# Patient Record
Sex: FEMALE | Race: WHITE
[De-identification: names, ages, dates, MRNs, and addresses within clinical notes are randomized per-mention and may not be internally consistent; named-entity substitution may affect disease eponyms.]

---

## 2017-03-29 ENCOUNTER — HOSPITAL ENCOUNTER (EMERGENCY)
Dept: HOSPITAL 10 - E/R | Age: 62
Discharge: HOME | End: 2017-03-29
Payer: COMMERCIAL

## 2017-03-29 VITALS — BODY MASS INDEX: 35.2 KG/M2 | WEIGHT: 152.12 LBS | HEIGHT: 55 IN

## 2017-03-29 VITALS — SYSTOLIC BLOOD PRESSURE: 141 MMHG | RESPIRATION RATE: 20 BRPM | TEMPERATURE: 98.9 F | DIASTOLIC BLOOD PRESSURE: 76 MMHG

## 2017-03-29 DIAGNOSIS — Z79.4: ICD-10-CM

## 2017-03-29 DIAGNOSIS — Z79.84: ICD-10-CM

## 2017-03-29 DIAGNOSIS — G58.8: Primary | ICD-10-CM

## 2017-03-29 DIAGNOSIS — E11.9: ICD-10-CM

## 2017-03-29 LAB
ADD SCAN DIFF: NO
ADD UMIC: NO
ALBUMIN SERPL-MCNC: 4.3 G/DL (ref 3.3–4.9)
ALBUMIN/GLOB SERPL: 1.34 {RATIO}
ALP SERPL-CCNC: 120 IU/L (ref 42–121)
ALT SERPL-CCNC: 63 IU/L (ref 13–69)
ANION GAP SERPL CALC-SCNC: 13 MMOL/L (ref 8–16)
APTT BLD: 34.7 SEC (ref 25–35)
AST SERPL-CCNC: 41 IU/L (ref 15–46)
BASOPHILS # BLD AUTO: 0 10^3/UL (ref 0–0.1)
BASOPHILS NFR BLD: 0.2 % (ref 0–2)
BILIRUB DIRECT SERPL-MCNC: 0 MG/DL (ref 0–0.2)
BILIRUB SERPL-MCNC: 0.2 MG/DL (ref 0.2–1.3)
BUN SERPL-MCNC: 19 MG/DL (ref 7–20)
CALCIUM SERPL-MCNC: 10.3 MG/DL (ref 8.4–10.2)
CHLORIDE SERPL-SCNC: 103 MMOL/L (ref 97–110)
CO2 SERPL-SCNC: 29 MMOL/L (ref 21–31)
COLOR UR: (no result)
CREAT SERPL-MCNC: 0.85 MG/DL (ref 0.44–1)
EOSINOPHIL # BLD: 0.2 10^3/UL (ref 0–0.5)
EOSINOPHIL NFR BLD: 3.6 % (ref 0–7)
ERYTHROCYTE [DISTWIDTH] IN BLOOD BY AUTOMATED COUNT: 14.9 % (ref 11.5–14.5)
GLOBULIN SER-MCNC: 3.2 G/DL (ref 1.3–3.2)
GLUCOSE SERPL-MCNC: 215 MG/DL (ref 70–220)
HCT VFR BLD CALC: 37.2 % (ref 37–47)
HGB BLD-MCNC: 12 G/DL (ref 12–16)
INR PPP: 0.91
KETONES UR STRIP.AUTO-MCNC: NEGATIVE MG/DL
LYMPHOCYTES # BLD AUTO: 1.6 10^3/UL (ref 0.8–2.9)
LYMPHOCYTES NFR BLD AUTO: 37.8 % (ref 15–51)
MCH RBC QN AUTO: 27.3 PG (ref 29–33)
MCHC RBC AUTO-ENTMCNC: 32.3 G/DL (ref 32–37)
MCV RBC AUTO: 84.5 FL (ref 82–101)
MONOCYTES # BLD: 0.3 10^3/UL (ref 0.3–0.9)
MONOCYTES NFR BLD: 6.3 % (ref 0–11)
NEUTROPHILS # BLD: 2.2 10^3/UL (ref 1.6–7.5)
NEUTROPHILS NFR BLD AUTO: 51.9 % (ref 39–77)
NITRITE UR QL STRIP.AUTO: NEGATIVE
NRBC # BLD MANUAL: 0 10^3/UL (ref 0–0)
NRBC BLD QL: 0 /100WBC (ref 0–0)
PLATELET # BLD: 229 10^3/UL (ref 140–415)
PMV BLD AUTO: 10.5 FL (ref 7.4–10.4)
POTASSIUM SERPL-SCNC: 4.1 MMOL/L (ref 3.5–5.1)
PROT SERPL-MCNC: 7.5 G/DL (ref 6.1–8.1)
PROTHROMBIN TIME: 12.2 SEC (ref 12.2–14.2)
PT RATIO: 1
RBC # BLD AUTO: 4.4 10^6/UL (ref 4.2–5.4)
RBC # UR AUTO: NEGATIVE /UL
SODIUM SERPL-SCNC: 141 MMOL/L (ref 135–144)
TROPONIN I SERPL-MCNC: < 0.012 NG/ML (ref 0–0.12)
URINE BILIRUBIN (DIP): NEGATIVE
URINE TOTAL PROTEIN (DIP): NEGATIVE
UROBILINOGEN UR STRIP-ACNC: (no result) (ref 0.1–1)
WBC # BLD AUTO: 4.2 10^3/UL (ref 4.8–10.8)
WBC # UR STRIP: NEGATIVE /UL

## 2017-03-29 PROCEDURE — 74176 CT ABD & PELVIS W/O CONTRAST: CPT

## 2017-03-29 PROCEDURE — 93005 ELECTROCARDIOGRAM TRACING: CPT

## 2017-03-29 PROCEDURE — 84484 ASSAY OF TROPONIN QUANT: CPT

## 2017-03-29 PROCEDURE — 96375 TX/PRO/DX INJ NEW DRUG ADDON: CPT

## 2017-03-29 PROCEDURE — 80053 COMPREHEN METABOLIC PANEL: CPT

## 2017-03-29 PROCEDURE — 83690 ASSAY OF LIPASE: CPT

## 2017-03-29 PROCEDURE — 87086 URINE CULTURE/COLONY COUNT: CPT

## 2017-03-29 PROCEDURE — 85730 THROMBOPLASTIN TIME PARTIAL: CPT

## 2017-03-29 PROCEDURE — 85025 COMPLETE CBC W/AUTO DIFF WBC: CPT

## 2017-03-29 PROCEDURE — 36415 COLL VENOUS BLD VENIPUNCTURE: CPT

## 2017-03-29 PROCEDURE — 96374 THER/PROPH/DIAG INJ IV PUSH: CPT

## 2017-03-29 PROCEDURE — 85610 PROTHROMBIN TIME: CPT

## 2017-03-29 PROCEDURE — 81003 URINALYSIS AUTO W/O SCOPE: CPT

## 2017-03-29 NOTE — RADRPT
PROCEDURE:   CT abdomen and pelvis without contrast. 

 

CLINICAL INDICATION:   Left upper quadrant flank pain

 

TECHNIQUE:   CT scan of the abdomen and pelvis without contrast was performed.  Sagittal and coronal
 reformatted images were obtained from the axial source images. CTDI = 16.02 mGy; DLP = 917.33 mGy-c
m

 

COMPARISON:   None available. 

 

FINDINGS:

 

Visualized lower thorax:  The lung bases are clear.  There is no evidence for pleural effusion.

 

Liver, gallbladder, pancreas and spleen:  Hepatomegaly of 20 cm is present with low attenuation of t
he liver parenchyma consistent with severe hepatic steatosis, liver contour is preserved.  There is 
no evidence for a liver mass or ductal dilatation.  Findings are compatible with prior cholecystecto
my.  No common bile duct abnormality is demonstrated.  The pancreas is unremarkable.  The spleen is 
normal in size.  

 

Adrenal glands and genitourinary system: The adrenal glands are normal bilaterally.  The kidneys are
 normal and size, contour and attenuation with no evidence for masses, calculi or hydronephrosis.  T
he ureters are unremarkable.  No urinary bladder abnormality is demonstrated.  There is a lobulated 
in contour and enlarged for the patient's age with multiple uterine corpus calcifications consistent
 with leiomyomata.  There is no evidence of ovarian or adnexal mass.  No free fluid is seen in the c
ul-de-sac.

 

Gastrointestinal system:  The stomach is normal in caliber with no abnormality of significance.  The
 small bowel is normal in caliber with no ileus, obstruction or wall thickening.  The appendix and s
urrounding fat are within the limits of normal.  The colon shows no evidence for wall thickening or 
acute abnormality.  There is no evidence for colitis or diverticulitis.

 

Peritoneum, retroperitoneum, lymph nodes and vessels: The abdominal aorta is normal in caliber.  The
re is mild aortic and iliac system atherosclerotic calcification.  The inferior vena cava is unremar
kable.  There is no evidence for adenopathy or mass.  There is no ascites. No pneumoperitoneum is pr
esent

 

Osseous structures and musculoskeletal findings:  There is no fracture, lytic or blastic lesion. Sev
ere degenerative disk narrowing and endplate sclerosis is present at L3-4, L4-5 and L5-S1. A small c
ystic lesion in the region of the umbilicus is possibly a week are patent with no intraperitoneal ab
normality or connection.  Calcified granuloma of the left gluteal fat is instantly noted.  No muscul
ar pathology is seen.

 

RPTAT:HJJR

 

IMPRESSION:

 

1.  No evidence of urinary tract calculi or hydronephrosis. 

2.  The stomach, spleen and left upper quadrant structures are unremarkable.

3.  Hepatomegaly and severe hepatic steatosis.

4.  Enlarged leiomyomatous uterus.

5.  Previous cholecystectomy.

6.  Atherosclerotic calcification of the aorta and iliac systems.

7.  Severe degenerative disk disease at L3-4, L4-5 and L5-S1.

_____________________________________________ 

Richmond Mohamud Physician           Date    Time 

Electronically viewed and signed by Richmond Mohamud Physician on 03/29/2017 18:58 

 

D:  03/29/2017 18:58  T:  03/29/2017 18:58

JR/

## 2017-03-29 NOTE — ERD
ER Documentation


Chief Complaint


Date/Time


DATE: 3/29/17 


TIME: 21:13


Chief Complaint


AP AND LEFT FLANK PAIN FOR THE PAST FEW DAYS. NO FEVERS. NO N/V





HPI


61-year-old female with a history of diabetes and fibromyalgia presenting with 

left flank pain for about 5 days.  The pain is burning, throbbing, radiating to 

her left upper quadrant.  It has progressively worsened over the past 5 days.  

She denies any associated nausea, vomiting, diarrhea, constipation, dysuria, 

fevers or chills.  No recent trauma.  Pain is worse with touching the area or 

with any movement.  Nothing seems to help with the pain.  Pain is rated as a 10 

out of 10.





ROS


All systems reviewed and are negative except as per history of present illness.





Medications


Home Meds


Active Scripts


Hydrocodone/Acetaminophen (Norco 5-325 Tablet) 1 Each Tablet, 1 TAB PO Q6H Y 

for PAIN, #20 TAB


   Prov:MICHELLE CARUSO MD         3/29/17


Valacyclovir HCl (Valtrex) 1,000 Mg Tablet, 1000 MG PO TID for 7 Days, TAB


   Prov:MICHELLE CARUSO MD         3/29/17


Reported Medications


Glimepiride* (Glimepiride*) 4 Mg Tablet, 4 MG PO WITH BREAKFAST DINNE, TAB


   3/29/17


Insulin Glargine* (Lantus*) 100 Unit/Ml Soln, 50 UNIT SC QHS, #1 VIAL


   3/29/17


Gabapentin* (Gabapentin*) 400 Mg Capsule, 400 MG PO BID, #90 CAP


   3/29/17


Metformin Hcl* (Metformin Hcl*) 1,000 Mg Tablet, 1000 MG PO WITH BREAKFAST DINNE

, #30 TAB


   3/29/17





Allergies


Allergies:  


Coded Allergies:  


     No Known Allergy (Unverified , 3/29/17)





PMhx/Soc


History of Surgery:  Yes (, gallbladder)


Anesthesia Reaction:  No


Hx Neurological Disorder:  No


Hx Respiratory Disorders:  No


Hx Cardiac Disorders:  No


Hx Psychiatric Problems:  No


Hx Miscellaneous Medical Probl:  Yes (DM II- Insulin and Oral, fibromyalgia)


Hx Alcohol Use:  No


Hx Substance Use:  No


Hx Tobacco Use:  No


Smoking Status:  Never smoker





FmHx


Family History:  No diabetes





Physical Exam


Vitals





Vital Signs








  Date Time  Temp Pulse Resp B/P Pulse Ox O2 Delivery O2 Flow Rate FiO2


 


3/29/17 17:16 98.9 78 20 141/76 98   








Physical Exam


Const: Nontoxic, well-appearing, distress secondary to pain


Head:   Atraumatic 


Eyes:    Normal Conjunctiva


ENT:    Normal External Ears, Nose and Mouth.


Neck:               Full range of motion..~ No meningismus.


Resp:    Clear to auscultation bilaterally


Cardio:    Regular rate and rhythm, no murmurs


Abd:    Soft, non tender, non distended.  No rebound or guarding.  Normal bowel 

sounds.  No rashes.


Skin:    No petechiae or rashes


Back:    No midline tenderness, left CVA mild tenderness


Ext:    No cyanosis, or edema


Neur:    Awake and alert


Psych:    Normal Mood and Affect


Result Diagram:  


3/29/17 1840                                                                   

             3/29/17 1840





Results 24 hrs





 Laboratory Tests








Test


  3/29/17


18:40 3/29/17


18:45


 


White Blood Count 4.210^3/ul  


 


Red Blood Count 4.4010^6/ul  


 


Hemoglobin 12.0g/dl  


 


Hematocrit 37.2%  


 


Mean Corpuscular Volume 84.5fl  


 


Mean Corpuscular Hemoglobin 27.3pg  


 


Mean Corpuscular Hemoglobin


Concent 32.3g/dl 


  


 


 


Red Cell Distribution Width 14.9%  


 


Platelet Count 81566^3/UL  


 


Mean Platelet Volume 10.5fl  


 


Neutrophils % 51.9%  


 


Lymphocytes % 37.8%  


 


Monocytes % 6.3%  


 


Eosinophils % 3.6%  


 


Basophils % 0.2%  


 


Nucleated Red Blood Cells % 0.0/100WBC  


 


Neutrophils # 2.210^3/ul  


 


Lymphocytes # 1.610^3/ul  


 


Monocytes # 0.310^3/ul  


 


Eosinophils # 0.210^3/ul  


 


Basophils # 0.010^3/ul  


 


Nucleated Red Blood Cells # 0.010^3/ul  


 


Prothrombin Time 12.2Sec  


 


Prothrombin Time Ratio 1.0  


 


INR International Normalized


Ratio 0.91 


  


 


 


Activated Partial


Thromboplast Time 34.7Sec 


  


 


 


Sodium Level 141mmol/L  


 


Potassium Level 4.1mmol/L  


 


Chloride Level 103mmol/L  


 


Carbon Dioxide Level 29mmol/L  


 


Anion Gap 13  


 


Blood Urea Nitrogen 19mg/dl  


 


Creatinine 0.85mg/dl  


 


Glucose Level 215mg/dl  


 


Calcium Level 10.3mg/dl  


 


Total Bilirubin 0.2mg/dl  


 


Direct Bilirubin 0.00mg/dl  


 


Indirect Bilirubin 0.2mg/dl  


 


Aspartate Amino Transf


(AST/SGOT) 41IU/L 


  


 


 


Alanine Aminotransferase


(ALT/SGPT) 63IU/L 


  


 


 


Alkaline Phosphatase 120IU/L  


 


Troponin I < 0.012ng/ml  


 


Total Protein 7.5g/dl  


 


Albumin 4.3g/dl  


 


Globulin 3.20g/dl  


 


Albumin/Globulin Ratio 1.34  


 


Lipase 167U/L  


 


Urine Color  LT. YELLOW 


 


Urine Clarity  CLEAR 


 


Urine pH  6.0 


 


Urine Specific Gravity  1.015 


 


Urine Ketones  NEGATIVE 


 


Urine Nitrite  NEGATIVE 


 


Urine Bilirubin  NEGATIVE 


 


Urine Urobilinogen  0.2  E.U./dL 


 


Urine Leukocyte Esterase  NEGATIVE 


 


Urine Hemoglobin  NEGATIVE 


 


Urine Glucose  0.5%% 


 


Urine Total Protein  NEGATIVE 








 Current Medications








 Medications


  (Trade)  Dose


 Ordered  Sig/Luis M


 Route


 PRN Reason  Start Time


 Stop Time Status Last Admin


Dose Admin


 


 Sodium Chloride


  (NS)  500 ml @ 


 500 mls/hr  Q1H STAT


 IV


   3/29/17 18:14


 3/29/17 19:13 DC 3/29/17 18:14


 


 


 Morphine Sulfate


  (morphine)  6 mg  ONCE  STAT


 IV


   3/29/17 18:14


 3/29/17 18:16 DC 3/29/17 18:45


 


 


 Hydromorphone HCl


  (Dilaudid)  1 mg  ONCE  STAT


 IV


   3/29/17 19:29


 3/29/17 19:30 DC 3/29/17 19:57


 











Procedures/MDM


EMERGENT LABS AND DIAGNOSTIC STUDIES: 


Lab Results above were reviewed and interpreted by me. 


No significant abnormalities, urinalysis normal





12-lead EKG was interpreted by SUKH Caruso MD: 


Normal Sinus Rhythm with ventricular rate of 70 beats per minute 


Normal axis 


Normal intervals 


No acute ST or T wave changes suggestive of acute ischemia or STEMI. 


___________________________________________________________ 


Radiology Results as interpreted by Radiology below were reviewed by TREVOR Caruso MD: 


CT abdomen and pelvis:


IMPRESSION:


 


1.  No evidence of urinary tract calculi or hydronephrosis. 


2.  The stomach, spleen and left upper quadrant structures are unremarkable.


3.  Hepatomegaly and severe hepatic steatosis.


4.  Enlarged leiomyomatous uterus.


5.  Previous cholecystectomy.


6.  Atherosclerotic calcification of the aorta and iliac systems.


7.  Severe degenerative disk disease at L3-4, L4-5 and L5-S1.


_____________________________________________ 


Physician Sandra           Date    Time 


Electronically viewed and signed by Richmond Mohamud Physician on 2017 18:58 








___________________________________________________________ 


Initial Nursing notes reviewed. 


Previous Medical Records requested via the Electronic Health Record. 





EMERGENCY DEPARTMENT COURSE / MEDICAL DECISION MAKING: 





The patient is presenting with left flank pain with stable vitals.  She is 

afebrile and well-appearing.  Differential includes but is not limited to 

pyelonephritis, gastritis, ureterolithiasis, retroperitoneal hemorrhage, aortic 

dissection or aneurysm.  Labs were all within normal limits.  Her EKG was 

nonischemic.  The CT of her abdomen and pelvis did not show evidence of kidney 

stones, retroperitoneal abnormalities, or any acute intra-abdominal 

abnormalities.  Her aorta was of normal caliber.  I have a low suspicion that 

she has an aortic dissection.  Urinalysis did not show evidence of infection.  

Her exam does not show evidence of shingles, however the patient's pain is in 

the right distribution and has the qualities of shingles.  I discussed this 

with the patient and told her that there is a possibility that her symptoms are 

secondary to shingles with no rash currently which may develop later.  I 

recommended treatment with Norco, Valtrex, and I recommended she increase her 

gabapentin to 3 times a day.  Patient is agreeable with this plan.  Return 

precautions were discussed.  I advised she follow-up with her primary care 

doctor in 1-2 days.





Patient's blood pressure was elevated (>120/80) but appears stable without 

evidence of hypertensive emergency or urgency. The patient was counseled about 

the risks of hypertension and urged to pursue outpatient monitoring and therapy 

within a week with their primary care physician.





Departure


Diagnosis:  


 Primary Impression:  


 Neuralgia of abdomen


 Additional Impression:  


 Neuralgia of left flank


Condition:  Stable


Patient Instructions:  Shingles (Herpes Zoster), Flank Pain, Uncertain Cause


Referrals:  


NO PRIMARY,CARE PHYSICIAN





Additional Instructions:  


Increase your Gabapentin to three times daily as we discussed. Your pain may be 

due to shingles, but without a rash, I cannot say that for sure. Follow up with 

your doctor in the next 1-2 days. Return for any worsening symptoms.











MICHELLE CARUSO MD Mar 29, 2017 21:19

## 2017-08-28 ENCOUNTER — HOSPITAL ENCOUNTER (OUTPATIENT)
Dept: HOSPITAL 10 - SDS | Age: 62
Discharge: HOME | End: 2017-08-28
Attending: ORTHOPAEDIC SURGERY
Payer: COMMERCIAL

## 2017-08-28 VITALS — HEART RATE: 76 BPM | DIASTOLIC BLOOD PRESSURE: 73 MMHG | RESPIRATION RATE: 17 BRPM | SYSTOLIC BLOOD PRESSURE: 144 MMHG

## 2017-08-28 VITALS
HEIGHT: 63 IN | WEIGHT: 149.91 LBS | HEIGHT: 63 IN | WEIGHT: 149.91 LBS | BODY MASS INDEX: 26.56 KG/M2 | BODY MASS INDEX: 26.56 KG/M2

## 2017-08-28 VITALS — RESPIRATION RATE: 8 BRPM | SYSTOLIC BLOOD PRESSURE: 142 MMHG | HEART RATE: 74 BPM | DIASTOLIC BLOOD PRESSURE: 75 MMHG

## 2017-08-28 VITALS — HEART RATE: 59 BPM | DIASTOLIC BLOOD PRESSURE: 72 MMHG | SYSTOLIC BLOOD PRESSURE: 129 MMHG | RESPIRATION RATE: 16 BRPM

## 2017-08-28 VITALS — RESPIRATION RATE: 18 BRPM | SYSTOLIC BLOOD PRESSURE: 126 MMHG | DIASTOLIC BLOOD PRESSURE: 70 MMHG | HEART RATE: 86 BPM

## 2017-08-28 VITALS — DIASTOLIC BLOOD PRESSURE: 83 MMHG | RESPIRATION RATE: 15 BRPM | HEART RATE: 76 BPM | SYSTOLIC BLOOD PRESSURE: 139 MMHG

## 2017-08-28 VITALS — RESPIRATION RATE: 20 BRPM | HEART RATE: 73 BPM | DIASTOLIC BLOOD PRESSURE: 60 MMHG | SYSTOLIC BLOOD PRESSURE: 124 MMHG

## 2017-08-28 VITALS — HEART RATE: 76 BPM | SYSTOLIC BLOOD PRESSURE: 150 MMHG | DIASTOLIC BLOOD PRESSURE: 77 MMHG | RESPIRATION RATE: 12 BRPM

## 2017-08-28 VITALS — HEART RATE: 74 BPM | DIASTOLIC BLOOD PRESSURE: 74 MMHG | SYSTOLIC BLOOD PRESSURE: 145 MMHG | RESPIRATION RATE: 11 BRPM

## 2017-08-28 VITALS — SYSTOLIC BLOOD PRESSURE: 161 MMHG | DIASTOLIC BLOOD PRESSURE: 82 MMHG | HEART RATE: 78 BPM | RESPIRATION RATE: 13 BRPM

## 2017-08-28 VITALS — RESPIRATION RATE: 13 BRPM | HEART RATE: 74 BPM | SYSTOLIC BLOOD PRESSURE: 145 MMHG | DIASTOLIC BLOOD PRESSURE: 76 MMHG

## 2017-08-28 VITALS — SYSTOLIC BLOOD PRESSURE: 142 MMHG | DIASTOLIC BLOOD PRESSURE: 76 MMHG | HEART RATE: 78 BPM | RESPIRATION RATE: 9 BRPM

## 2017-08-28 DIAGNOSIS — M65.312: Primary | ICD-10-CM

## 2017-08-28 DIAGNOSIS — E11.9: ICD-10-CM

## 2017-08-28 PROCEDURE — 26055 INCISE FINGER TENDON SHEATH: CPT

## 2017-08-28 PROCEDURE — 82962 GLUCOSE BLOOD TEST: CPT

## 2017-08-28 RX ADMIN — HYDROMORPHONE HYDROCHLORIDE PRN MG: 2 INJECTION INTRAMUSCULAR; INTRAVENOUS; SUBCUTANEOUS at 15:56

## 2017-08-28 RX ADMIN — Medication PRN MG: at 15:50

## 2017-08-28 RX ADMIN — Medication PRN MG: at 15:43

## 2017-08-28 RX ADMIN — HYDROMORPHONE HYDROCHLORIDE PRN MG: 2 INJECTION INTRAMUSCULAR; INTRAVENOUS; SUBCUTANEOUS at 15:40

## 2017-08-28 RX ADMIN — HYDROMORPHONE HYDROCHLORIDE PRN MG: 2 INJECTION INTRAMUSCULAR; INTRAVENOUS; SUBCUTANEOUS at 15:46

## 2017-08-28 NOTE — SIPON
Date/Time of Note


Date/Time of Note


DATE: 8/28/17 


TIME: 13:51





Operative Report


Preoperative Diagnosis


left trigger thumb


Postoperative Diagnosis


sane


Operation/Procedure Performed


trigger thumb release


Surgeon:  MERA THOMSON MD


Estimated Blood Loss:  none


Transfusion Required:  no


Specimen:  none


Grafts/Implants:  none


Complications:  no











MERA THOMSON MD Aug 28, 2017 13:52

## 2017-08-28 NOTE — OPR
DATE OF OPERATION:  08/28/2017

 

PREOPERATIVE DIAGNOSIS:  Left trigger thumb.

 

POSTOPERATIVE DIAGNOSIS:  Left trigger thumb.

 

OPERATION PERFORMED:  Left trigger thumb release, A1 pulley

release.

 

SURGEON:  Miky Pedroza MD

 

ASSISTANT:  Staff anesthetist, Julius.

 

ANESTHESIA:  General anesthetic by the anesthesiologist.

 

OPERATIVE PROCEDURE:  I reexamined the patient in the preop

holding area.  I drew in the surgical incision.  I showed the

marked drawn surgical incision to the patient.  I confirmed the

operative procedure, plan with patient wide awake.

 

Informed consent: At time we scheduled the operative procedure,

we talked to the patient about the risks and hazards of surgery,

talked about operative mortality, wound infection, nerve injury,

good result, bad result, the potential complications.  Patient

signed the note confirming that informed consent conversation.

 

Patient taken to surgery, anesthetized as above.  Sterile prep

and drape performed.  Pneumatic tourniquet inflated to 250 mmHg.

A small transverse centimeter to 1.5-cm incision made overlying

the A1 pulley.  The flexor tendon sheath was identified.  The

neurovascular bundle was protected behind retractors.  The sheath

was incised with the knife shallowly in a superficial manner and

then split with scissors proximally and distally, taking care to

protect the A2 pulley.  Wound was closed with an interrupted

Vicryl Rapide suture, and a bulky dressing applied.

 

DISCHARGE MEDICATION:

1.   Hydrocodone.

2.   Acetaminophen.

3.   Keflex.

 

FOLLOWUP:  Will be in my office in a week.

 

 

 

Dictated By:  Miky Pedroza MD

 

/bryson/chencho

Job#:  [id number]/Document#:  [id number]

D:  08/28/2017 15:47

T:  08/28/2017 20:09

AFTAB

## 2017-08-28 NOTE — HPN
Date/Time of Note


Date/Time of Note


DATE: 8/28/17 


TIME: 13:50





Interval H&P Admission Note


Pt. seen H&P reviewed:  No system changes











MERA THOMSON MD Aug 28, 2017 13:51

## 2018-04-05 ENCOUNTER — HOSPITAL ENCOUNTER (OUTPATIENT)
Age: 63
Discharge: HOME | End: 2018-04-05

## 2018-04-05 ENCOUNTER — HOSPITAL ENCOUNTER (OUTPATIENT)
Dept: HOSPITAL 91 - GIL | Age: 63
Discharge: HOME | End: 2018-04-05
Payer: COMMERCIAL

## 2018-04-05 DIAGNOSIS — E11.9: ICD-10-CM

## 2018-04-05 DIAGNOSIS — K29.60: ICD-10-CM

## 2018-04-05 DIAGNOSIS — K44.9: ICD-10-CM

## 2018-04-05 DIAGNOSIS — Z12.11: Primary | ICD-10-CM

## 2018-04-05 DIAGNOSIS — K64.8: ICD-10-CM

## 2018-04-05 PROCEDURE — 43239 EGD BIOPSY SINGLE/MULTIPLE: CPT

## 2018-04-05 PROCEDURE — 88305 TISSUE EXAM BY PATHOLOGIST: CPT

## 2018-04-05 PROCEDURE — 82962 GLUCOSE BLOOD TEST: CPT

## 2018-04-05 PROCEDURE — 87081 CULTURE SCREEN ONLY: CPT
